# Patient Record
Sex: MALE | Race: WHITE | NOT HISPANIC OR LATINO | Employment: FULL TIME | URBAN - METROPOLITAN AREA
[De-identification: names, ages, dates, MRNs, and addresses within clinical notes are randomized per-mention and may not be internally consistent; named-entity substitution may affect disease eponyms.]

---

## 2017-08-04 ENCOUNTER — HOSPITAL ENCOUNTER (EMERGENCY)
Facility: HOSPITAL | Age: 52
Discharge: HOME | End: 2017-08-04
Payer: COMMERCIAL

## 2017-08-04 VITALS
DIASTOLIC BLOOD PRESSURE: 78 MMHG | WEIGHT: 206 LBS | SYSTOLIC BLOOD PRESSURE: 142 MMHG | BODY MASS INDEX: 26.44 KG/M2 | HEIGHT: 74 IN | OXYGEN SATURATION: 97 % | TEMPERATURE: 99 F | HEART RATE: 83 BPM | RESPIRATION RATE: 18 BRPM

## 2017-08-04 DIAGNOSIS — S93.402A SPRAIN OF LEFT ANKLE, UNSPECIFIED LIGAMENT, INITIAL ENCOUNTER: Primary | ICD-10-CM

## 2017-08-04 DIAGNOSIS — S99.912A LEFT ANKLE INJURY, INITIAL ENCOUNTER: ICD-10-CM

## 2017-08-04 PROCEDURE — 29515 APPLICATION SHORT LEG SPLINT: CPT | Mod: LT

## 2017-08-04 PROCEDURE — 25000003 PHARM REV CODE 250: Performed by: PHYSICIAN ASSISTANT

## 2017-08-04 PROCEDURE — 99283 EMERGENCY DEPT VISIT LOW MDM: CPT | Mod: 25

## 2017-08-04 RX ORDER — ONDANSETRON 4 MG/1
4 TABLET, ORALLY DISINTEGRATING ORAL
Status: COMPLETED | OUTPATIENT
Start: 2017-08-04 | End: 2017-08-04

## 2017-08-04 RX ORDER — OXYCODONE AND ACETAMINOPHEN 10; 325 MG/1; MG/1
1 TABLET ORAL
Status: COMPLETED | OUTPATIENT
Start: 2017-08-04 | End: 2017-08-04

## 2017-08-04 RX ORDER — ATORVASTATIN CALCIUM 20 MG/1
20 TABLET, FILM COATED ORAL DAILY
COMMUNITY

## 2017-08-04 RX ORDER — LISINOPRIL 20 MG/1
20 TABLET ORAL DAILY
COMMUNITY

## 2017-08-04 RX ADMIN — OXYCODONE HYDROCHLORIDE AND ACETAMINOPHEN 1 TABLET: 10; 325 TABLET ORAL at 08:08

## 2017-08-04 RX ADMIN — ONDANSETRON 4 MG: 4 TABLET, ORALLY DISINTEGRATING ORAL at 08:08

## 2017-08-05 NOTE — DISCHARGE INSTRUCTIONS
Ibuprofen 600 mg every 6-8 hours and/or  Tylenol 650-1000 mg every 4-6 hours for fever or pain relief. No more than 4000mg of Tylenol every 24 hours.  Do not take Ibuprofen and Naproxen at the same time.

## 2017-08-05 NOTE — ED PROVIDER NOTES
Encounter Date: 8/4/2017       History     Chief Complaint   Patient presents with    Ankle Pain     left ankle pain, stepped in a hole PTA, ice applied     52 yo WM with left lateral ankle pain after stepping in a hole and twisting his ankle about 45 min pta      The history is provided by the patient.   Leg Pain    The incident occurred in the yard. The injury mechanism was torsion. The incident occurred just prior to arrival. The pain is present in the left ankle. The quality of the pain is described as throbbing and sharp. The pain is at a severity of 6/10. The pain has been constant since onset. Associated symptoms include inability to bear weight. Pertinent negatives include no numbness, no loss of motion, no muscle weakness and no loss of sensation. He reports no foreign bodies present. The symptoms are aggravated by bearing weight, activity and palpation. He has tried ice, elevation and NSAIDs for the symptoms. The treatment provided no relief.     Review of patient's allergies indicates:   Allergen Reactions    Codeine Nausea And Vomiting     Past Medical History:   Diagnosis Date    High cholesterol     Hypertension      History reviewed. No pertinent surgical history.  History reviewed. No pertinent family history.  Social History   Substance Use Topics    Smoking status: Never Smoker    Smokeless tobacco: Never Used    Alcohol use Yes      Comment: occassionally     Review of Systems   Constitutional: Negative.    HENT: Negative.    Musculoskeletal: Positive for arthralgias.   Skin: Negative.    Neurological: Negative for numbness.       Physical Exam     Initial Vitals [08/04/17 1951]   BP Pulse Resp Temp SpO2   (!) 143/67 96 18 98.5 °F (36.9 °C) 97 %      MAP       92.33         Physical Exam    Nursing note and vitals reviewed.  Constitutional: He appears well-developed and well-nourished.   Pt sitting in wheelchair with left leg elevated and ice on lateral ankle   HENT:   Head: Normocephalic  and atraumatic.   Eyes: Conjunctivae are normal.   Neck: Normal range of motion.   Cardiovascular: Intact distal pulses.   Pulmonary/Chest: No respiratory distress.   Musculoskeletal:        Left ankle: He exhibits swelling and ecchymosis. He exhibits normal range of motion, no deformity, no laceration and normal pulse. Tenderness. Lateral malleolus (greatest laterally) and medial malleolus tenderness found. No AITFL, no CF ligament, no posterior TFL, no head of 5th metatarsal and no proximal fibula tenderness found. Achilles tendon normal.        Left foot: Normal. There is normal range of motion, no tenderness and no bony tenderness.   Skin: Skin is warm and dry. Capillary refill takes less than 2 seconds. No rash noted. No erythema.         ED Course   Procedures  Labs Reviewed - No data to display              Imaging Results          X-Ray Ankle Complete Left (Final result)  Result time 08/04/17 20:26:44    Final result by ANDREW Morales Sr., MD (08/04/17 20:26:44)                 Impression:      1. There is mild prominence of the soft tissue thickness lateral to the left ankle. This is consistent with the patient's history and characteristic of a soft tissue contusion.  2. There is a small spur at the site of attachment of the plantar fascia to the calcaneus.       Electronically signed by: ANDREW MORALES MD  Date:     08/04/17  Time:    20:26              Narrative:    3 view x-ray of the left ankle    Clinical History:     Unspecified injury of left ankle, initial encounter    Findings:     There is no fracture. There is no dislocation. There is a small spur at the site of attachment of the plantar fascia to the calcaneus. There is mild prominence of the soft tissue thickness lateral to the left ankle.                                     Pt placed in walking boot.  Has crutches at Home.  Understands to start PT next week        ED Course     Clinical Impression:   The primary encounter diagnosis was Sprain of  left ankle, unspecified ligament, initial encounter. A diagnosis of Left ankle injury, initial encounter was also pertinent to this visit.                           David Loja PA-C  08/04/17 [id number]